# Patient Record
Sex: FEMALE | Race: BLACK OR AFRICAN AMERICAN | NOT HISPANIC OR LATINO | URBAN - METROPOLITAN AREA
[De-identification: names, ages, dates, MRNs, and addresses within clinical notes are randomized per-mention and may not be internally consistent; named-entity substitution may affect disease eponyms.]

---

## 2024-06-19 ENCOUNTER — TELEPHONE ENCOUNTER (OUTPATIENT)
Dept: URBAN - METROPOLITAN AREA CLINIC 6 | Facility: CLINIC | Age: 39
End: 2024-06-19

## 2024-06-24 ENCOUNTER — DASHBOARD ENCOUNTERS (OUTPATIENT)
Age: 39
End: 2024-06-24

## 2024-06-24 ENCOUNTER — OFFICE VISIT (OUTPATIENT)
Dept: URBAN - METROPOLITAN AREA CLINIC 118 | Facility: CLINIC | Age: 39
End: 2024-06-24
Payer: COMMERCIAL

## 2024-06-24 VITALS
HEART RATE: 73 BPM | TEMPERATURE: 97.7 F | WEIGHT: 231 LBS | SYSTOLIC BLOOD PRESSURE: 139 MMHG | DIASTOLIC BLOOD PRESSURE: 88 MMHG | HEIGHT: 63 IN | BODY MASS INDEX: 40.93 KG/M2

## 2024-06-24 DIAGNOSIS — R19.8 RECTAL SYMPTOM: ICD-10-CM

## 2024-06-24 DIAGNOSIS — K59.09 CHRONIC CONSTIPATION: ICD-10-CM

## 2024-06-24 PROCEDURE — 99204 OFFICE O/P NEW MOD 45 MIN: CPT | Performed by: INTERNAL MEDICINE

## 2024-06-24 RX ORDER — MECOBALAMIN 5000 MCG
AS DIRECTED LOZENGE ORAL
Status: ACTIVE | COMMUNITY

## 2024-06-24 RX ORDER — OLMESARTAN MEDOXOMIL 20 MG/1
1 TABLET TABLET, FILM COATED ORAL ONCE A DAY
Status: ACTIVE | COMMUNITY

## 2024-06-24 RX ORDER — HYDROCORTISONE ACETATE 25 MG/1
1 SUPPOSITORY SUPPOSITORY RECTAL
Qty: 14 | Refills: 0 | OUTPATIENT
Start: 2024-06-25 | End: 2024-07-09

## 2024-06-24 NOTE — HPI-TODAY'S VISIT:
Patient is a 38 yo female referred for evaluation of constipation and rectal discomfort. A copy of this document will be sent to the referring provider. Reports few months after had gastric sleeve she started becoming constipated. Follows with nutritionist and has tried increasing intake of fiber and protein in diet. Had 1-2 soft stools/day prior to surgery, now having more hard pellet stools with straining. Sxs of constipation were worst in January before initiating lifestyle mods. Denies diarrhea. At time of onset noticed palpable protrusion near anus with most BMs that can be pushed back into rectum. Per pt no increase in size and not painful. Associated rectal itching. No notable difference with rectal hydrocortisone cream tried twice. Trying enemas prn which helps. No BRPRP w/ wiping or in stool, dark stools, fever/chills, or unintentional weight loss. No FHx of CRC or IBD. No prior colonoscopy.

## 2024-07-23 ENCOUNTER — OFFICE VISIT (OUTPATIENT)
Dept: URBAN - METROPOLITAN AREA CLINIC 118 | Facility: CLINIC | Age: 39
End: 2024-07-23
Payer: COMMERCIAL

## 2024-07-23 VITALS
HEART RATE: 91 BPM | SYSTOLIC BLOOD PRESSURE: 131 MMHG | TEMPERATURE: 97.9 F | WEIGHT: 231.6 LBS | BODY MASS INDEX: 41.04 KG/M2 | HEIGHT: 63 IN | DIASTOLIC BLOOD PRESSURE: 85 MMHG

## 2024-07-23 DIAGNOSIS — K64.0 GRADE I INTERNAL HEMORRHOIDS: ICD-10-CM

## 2024-07-23 DIAGNOSIS — K59.01 CONSTIPATION: ICD-10-CM

## 2024-07-23 DIAGNOSIS — R19.8 RECTAL SYMPTOM: ICD-10-CM

## 2024-07-23 DIAGNOSIS — R19.4 ALTERED BOWEL HABITS: ICD-10-CM

## 2024-07-23 PROCEDURE — 99213 OFFICE O/P EST LOW 20 MIN: CPT | Performed by: INTERNAL MEDICINE

## 2024-07-23 PROCEDURE — 46221 LIGATION OF HEMORRHOID(S): CPT | Performed by: INTERNAL MEDICINE

## 2024-07-23 RX ORDER — OLMESARTAN MEDOXOMIL 20 MG/1
1 TABLET TABLET, FILM COATED ORAL ONCE A DAY
Status: ACTIVE | COMMUNITY

## 2024-07-23 RX ORDER — MECOBALAMIN 5000 MCG
AS DIRECTED LOZENGE ORAL
Status: ACTIVE | COMMUNITY

## 2024-07-23 NOTE — HPI-TODAY'S VISIT:
1 month f/u to eval her constipation and rectal symptoms and perform anoscopy +/- hemorrhoid banding will have bm daily, just sometimes has to push to have her bowels move feels rectal swelling as her symptom, especially when significantly constipated   6/24/24 VICOTR HUGO Patient is a 40 yo female referred for evaluation of constipation and rectal discomfort. A copy of this document will be sent to the referring provider. Reports few months after had gastric sleeve she started becoming constipated. Follows with nutritionist and has tried increasing intake of fiber and protein in diet. Had 1-2 soft stools/day prior to surgery, now having more hard pellet stools with straining. Sxs of constipation were worst in January before initiating lifestyle mods. Denies diarrhea. At time of onset noticed palpable protrusion near anus with most BMs that can be pushed back into rectum. Per pt no increase in size and not painful. Associated rectal itching. No notable difference with rectal hydrocortisone cream tried twice. Trying enemas prn which helps. No BRPRP w/ wiping or in stool, dark stools, fever/chills, or unintentional weight loss. No FHx of CRC or IBD. No prior colonoscopy.

## 2024-07-23 NOTE — PHYSICAL EXAM CHEST:
[address]  [address] [id number]  Phone: [phone]  Fax: [phone]    Jo-Ann Ledesma        February 22, 2019     Patient: Bebeto Johnston   YOB: 1975   Date of Visit: 2/22/2019       To Whom It May Concern: It is my medical opinion that Kristine Graves may return to work on 2/25/19 with the following restrictions: lifting/carrying not to exceed 20 lbs. .    If you have any questions or concerns, please don't hesitate to call.     Sincerely,        RAKESH Ledesma breathing is unlabored without accessory muscle use.

## 2024-07-23 NOTE — EXAM-PHYSICAL EXAM
Latoya in room as chaperone Rectal exam: normal external exam  Anoscopy performed using self lighted anoscope Grade 1 RA, Grade 1 RP, Grade 1 LL  Banded RP, RA, and LL using [facility] [employer] system without complication after reviewing R/B/A with the patient  was causing some cramping so I loosened them